# Patient Record
Sex: FEMALE | Race: BLACK OR AFRICAN AMERICAN | Employment: FULL TIME | URBAN - METROPOLITAN AREA
[De-identification: names, ages, dates, MRNs, and addresses within clinical notes are randomized per-mention and may not be internally consistent; named-entity substitution may affect disease eponyms.]

---

## 2021-04-19 ENCOUNTER — VIRTUAL VISIT (OUTPATIENT)
Dept: FAMILY MEDICINE CLINIC | Age: 26
End: 2021-04-19
Payer: COMMERCIAL

## 2021-04-19 DIAGNOSIS — R53.83 LOW ENERGY: ICD-10-CM

## 2021-04-19 DIAGNOSIS — F90.2 ADHD (ATTENTION DEFICIT HYPERACTIVITY DISORDER), COMBINED TYPE: Primary | ICD-10-CM

## 2021-04-19 DIAGNOSIS — Z71.89 ADVICE GIVEN ABOUT COVID-19 VIRUS INFECTION: ICD-10-CM

## 2021-04-19 DIAGNOSIS — D35.2 PITUITARY ADENOMA (HCC): ICD-10-CM

## 2021-04-19 PROCEDURE — 99203 OFFICE O/P NEW LOW 30 MIN: CPT | Performed by: FAMILY MEDICINE

## 2021-04-19 RX ORDER — ACETAMINOPHEN 500 MG
2000 TABLET ORAL DAILY
Qty: 30 CAP | Refills: 0 | Status: SHIPPED | OUTPATIENT
Start: 2021-04-19

## 2021-04-19 RX ORDER — MONTELUKAST SODIUM 10 MG/1
10 TABLET ORAL DAILY
COMMUNITY
End: 2021-07-26 | Stop reason: SDUPTHER

## 2021-04-19 RX ORDER — GALCANEZUMAB 120 MG/ML
1 INJECTION, SOLUTION SUBCUTANEOUS
COMMUNITY
End: 2021-04-19 | Stop reason: SDUPTHER

## 2021-04-19 RX ORDER — NORETHINDRONE ACETATE AND ETHINYL ESTRADIOL 1; 20 MG/1; UG/1
1 TABLET ORAL DAILY
Qty: 30 TAB | Refills: 11 | Status: SHIPPED | OUTPATIENT
Start: 2021-04-19 | End: 2021-06-22 | Stop reason: SDUPTHER

## 2021-04-19 RX ORDER — NORETHINDRONE ACETATE AND ETHINYL ESTRADIOL 1; .02 MG/1; MG/1
1 TABLET ORAL DAILY
COMMUNITY
End: 2021-04-19 | Stop reason: SDUPTHER

## 2021-04-19 RX ORDER — DEXTROAMPHETAMINE SACCHARATE, AMPHETAMINE ASPARTATE, DEXTROAMPHETAMINE SULFATE AND AMPHETAMINE SULFATE 2.5; 2.5; 2.5; 2.5 MG/1; MG/1; MG/1; MG/1
10 TABLET ORAL
COMMUNITY
End: 2021-06-22 | Stop reason: SDUPTHER

## 2021-04-19 RX ORDER — CETIRIZINE HYDROCHLORIDE 10 MG/1
CAPSULE, LIQUID FILLED ORAL DAILY
COMMUNITY

## 2021-04-19 RX ORDER — ACETAMINOPHEN 500 MG
2000 TABLET ORAL DAILY
COMMUNITY
End: 2021-04-19 | Stop reason: SDUPTHER

## 2021-04-19 RX ORDER — GALCANEZUMAB 120 MG/ML
1 INJECTION, SOLUTION SUBCUTANEOUS
Qty: 1 ADJUSTABLE DOSE PRE-FILLED PEN SYRINGE | Refills: 5 | Status: SHIPPED | OUTPATIENT
Start: 2021-04-19 | End: 2021-04-30 | Stop reason: SDUPTHER

## 2021-04-19 RX ORDER — BISMUTH SUBSALICYLATE 262 MG
1 TABLET,CHEWABLE ORAL DAILY
COMMUNITY

## 2021-04-19 RX ORDER — DEXTROAMPHETAMINE SACCHARATE, AMPHETAMINE ASPARTATE MONOHYDRATE, DEXTROAMPHETAMINE SULFATE AND AMPHETAMINE SULFATE 5; 5; 5; 5 MG/1; MG/1; MG/1; MG/1
20 CAPSULE, EXTENDED RELEASE ORAL
COMMUNITY
End: 2021-04-30 | Stop reason: SDUPTHER
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
## 2021-04-19 NOTE — PROGRESS NOTES
Chief Complaint   Patient presents with   [facility] Establish Care     1. Have you been to the ER, urgent care clinic since your last visit? Hospitalized since your last visit? No    2. Have you seen or consulted any other health care providers outside of the [address] [place] [address] since your last visit? Include any pap smears or colon screening. No    Call placed to pt. Verified patient with two type of identifiers. here to establish care. Relocated from [facility].

## 2021-04-23 ENCOUNTER — DOCUMENTATION ONLY (OUTPATIENT)
Dept: FAMILY MEDICINE CLINIC | Age: 26
End: 2021-04-23

## 2021-04-26 ENCOUNTER — CLINICAL SUPPORT (OUTPATIENT)
Dept: FAMILY MEDICINE CLINIC | Age: 26
End: 2021-04-26

## 2021-04-26 DIAGNOSIS — F90.2 ADHD (ATTENTION DEFICIT HYPERACTIVITY DISORDER), COMBINED TYPE: ICD-10-CM

## 2021-04-26 DIAGNOSIS — R53.83 LOW ENERGY: ICD-10-CM

## 2021-04-26 DIAGNOSIS — D35.2 PITUITARY ADENOMA (HCC): ICD-10-CM

## 2021-04-27 LAB
ALBUMIN SERPL-MCNC: 3.6 G/DL (ref 3.5–5)
ALBUMIN/GLOB SERPL: 0.9 {RATIO} (ref 1.1–2.2)
ALP SERPL-CCNC: 57 U/L (ref 45–117)
ALT SERPL-CCNC: 24 U/L (ref 12–78)
ANION GAP SERPL CALC-SCNC: 7 MMOL/L (ref 5–15)
AST SERPL-CCNC: 22 U/L (ref 15–37)
BASOPHILS # BLD: 0 K/UL (ref 0–0.1)
BASOPHILS NFR BLD: 1 % (ref 0–1)
BILIRUB SERPL-MCNC: 0.6 MG/DL (ref 0.2–1)
BUN SERPL-MCNC: 12 MG/DL (ref 6–20)
BUN/CREAT SERPL: 12 (ref 12–20)
CALCIUM SERPL-MCNC: 9.3 MG/DL (ref 8.5–10.1)
CHLORIDE SERPL-SCNC: 104 MMOL/L (ref 97–108)
CO2 SERPL-SCNC: 26 MMOL/L (ref 21–32)
CREAT SERPL-MCNC: 0.99 MG/DL (ref 0.55–1.02)
DIFFERENTIAL METHOD BLD: ABNORMAL
EOSINOPHIL # BLD: 0.2 K/UL (ref 0–0.4)
EOSINOPHIL NFR BLD: 5 % (ref 0–7)
ERYTHROCYTE [DISTWIDTH] IN BLOOD BY AUTOMATED COUNT: 13.2 % (ref 11.5–14.5)
EST. AVERAGE GLUCOSE BLD GHB EST-MCNC: 100 MG/DL
GLOBULIN SER CALC-MCNC: 4 G/DL (ref 2–4)
GLUCOSE SERPL-MCNC: 118 MG/DL (ref 65–100)
HBA1C MFR BLD: 5.1 % (ref 4–5.6)
HCT VFR BLD AUTO: 43.1 % (ref 35–47)
HGB BLD-MCNC: 13.6 G/DL (ref 11.5–16)
IMM GRANULOCYTES # BLD AUTO: 0 K/UL (ref 0–0.04)
IMM GRANULOCYTES NFR BLD AUTO: 0 % (ref 0–0.5)
LYMPHOCYTES # BLD: 2.4 K/UL (ref 0.8–3.5)
LYMPHOCYTES NFR BLD: 50 % (ref 12–49)
MCH RBC QN AUTO: 29.8 PG (ref 26–34)
MCHC RBC AUTO-ENTMCNC: 31.6 G/DL (ref 30–36.5)
MCV RBC AUTO: 94.3 FL (ref 80–99)
MONOCYTES # BLD: 0.3 K/UL (ref 0–1)
MONOCYTES NFR BLD: 7 % (ref 5–13)
NEUTS SEG # BLD: 1.8 K/UL (ref 1.8–8)
NEUTS SEG NFR BLD: 37 % (ref 32–75)
NRBC # BLD: 0 K/UL (ref 0–0.01)
NRBC BLD-RTO: 0 PER 100 WBC
PLATELET # BLD AUTO: 216 K/UL (ref 150–400)
PMV BLD AUTO: 9.9 FL (ref 8.9–12.9)
POTASSIUM SERPL-SCNC: 4 MMOL/L (ref 3.5–5.1)
PROLACTIN SERPL-MCNC: 24.6 NG/ML
PROT SERPL-MCNC: 7.6 G/DL (ref 6.4–8.2)
RBC # BLD AUTO: 4.57 M/UL (ref 3.8–5.2)
SODIUM SERPL-SCNC: 137 MMOL/L (ref 136–145)
TSH SERPL DL<=0.05 MIU/L-ACNC: 1.69 UIU/ML (ref 0.36–3.74)
WBC # BLD AUTO: 4.8 K/UL (ref 3.6–11)

## 2021-04-30 ENCOUNTER — VIRTUAL VISIT (OUTPATIENT)
Dept: FAMILY MEDICINE CLINIC | Age: 26
End: 2021-04-30
Payer: COMMERCIAL

## 2021-04-30 ENCOUNTER — DOCUMENTATION ONLY (OUTPATIENT)
Dept: FAMILY MEDICINE CLINIC | Age: 26
End: 2021-04-30

## 2021-04-30 DIAGNOSIS — F90.2 ADHD (ATTENTION DEFICIT HYPERACTIVITY DISORDER), COMBINED TYPE: ICD-10-CM

## 2021-04-30 DIAGNOSIS — G43.909 MIGRAINE SYNDROME: Primary | ICD-10-CM

## 2021-04-30 PROCEDURE — 99213 OFFICE O/P EST LOW 20 MIN: CPT | Performed by: FAMILY MEDICINE

## 2021-04-30 RX ORDER — GALCANEZUMAB 120 MG/ML
1 INJECTION, SOLUTION SUBCUTANEOUS
Qty: 1 ADJUSTABLE DOSE PRE-FILLED PEN SYRINGE | Refills: 5 | Status: SHIPPED | OUTPATIENT
Start: 2021-04-30 | End: 2021-07-26 | Stop reason: SDUPTHER

## 2021-04-30 RX ORDER — DEXTROAMPHETAMINE SACCHARATE, AMPHETAMINE ASPARTATE MONOHYDRATE, DEXTROAMPHETAMINE SULFATE AND AMPHETAMINE SULFATE 5; 5; 5; 5 MG/1; MG/1; MG/1; MG/1
20 CAPSULE, EXTENDED RELEASE ORAL
Qty: 30 CAP | Refills: 0 | Status: SHIPPED | OUTPATIENT
Start: 2021-04-30 | End: 2021-06-22 | Stop reason: SDUPTHER

## 2021-04-30 NOTE — PROGRESS NOTES
HISTORY OF PRESENT ILLNESS  Jan Finley is a 32 y.o. female, new patient second visit present on virtual visit, very upset stating that her migraine medication has not been approved secondary to medical doctor wrong documentations stating that she also did not get her Adderall patient was supposed to give urine drug analysis unfortunately was not done regardless today patient stating that she ran out of her Adderall and she requesting a refill, she is also upset that [facility] obtain laboratory testing from her without a consent!!!!!  Regarding record medical claim of pituitary adenoma thyroid disease and goiter on the last visit! Today she is upset that my PCP obtain some laboratory testing from her for her medical conditions she just requested to be given Adderall and her Xanax and a migraine injections prescriptions.      Patient with current diagnosis of migraine headache for which  patient states that she is having few migraines headache per week for the last 2 years and current medication has been able to control it very well, patient stating that the current migraine headache is one sided pulsating lasting few hrs, has tried otc not helping, pt states that the HA Worsens by lights and loud noises,     the pain is associated with feeling of  Nausea, and pt hadno voimiting the pain is 7/10 at this time,   if the HA occurs the pt is unable to do any work while having the HA,     ADHD, pt states that current med has been helping this serious medical condition,  Pt is able the perform and be functional at  School and at home,  Pt states that the INATTENTION, HYPERACTIVITY, IMPULSIVITY all are  getting better and are nice controlled better with the current meds,  was less distracted and forgetful , having a better concentration , a better time management and did not not Misplaces things ,   Socially, has had noTrouble with the Law, No hx of cardiac dz,no recent seizure, Not wanted to heart self or others    Current Outpatient Medications   Medication Sig Dispense Refill    amphetamine-dextroamphetamine XR (Adderall XR) 20 mg XR capsule Take 20 mg by mouth every morning.  dextroamphetamine-amphetamine (AdderalL) 10 mg tablet Take 10 mg by mouth daily as needed.  montelukast (Singulair) 10 mg tablet Take 10 mg by mouth daily.  Cetirizine (ZyrTEC) 10 mg cap Take  by mouth daily.  multivitamin (ONE A DAY) tablet Take 1 Tab by mouth daily.  cholecalciferol (VITAMIN D3) (2,000 UNITS /50 MCG) cap capsule Take 1 Cap by mouth daily. 30 Cap 0    Junel 1/20, 21, 1-20 mg-mcg tablet Take 1 Tab by mouth daily. Brand only pt with allergic rxn 30 Tab 11    galcanezumab-gnlm (Emgality Syringe) 120 mg/mL syrg 1 Syringe by SubCUTAneous route every month. 1 Adjustable Dose Pre-filled Pen Syringe 5     Allergies   Allergen Reactions    Azithromycin Hives    Pcn [Penicillins] Swelling     Past Medical History:   Diagnosis Date    ADHD     GERD (gastroesophageal reflux disease)     Headache      No past surgical history on file. Family History   Problem Relation Age of Onset    Attention Deficit Hyperactivity Disorder Mother     No Known Problems Father      Social History     Tobacco Use    Smoking status: Never Smoker    Smokeless tobacco: Never Used   Substance Use Topics    Alcohol use: Yes     Comment: social      Lab Results   Component Value Date/Time    Hemoglobin A1c 5.1 04/26/2021 01:06 PM    Glucose 118 (H) 04/26/2021 01:06 PM    Creatinine 0.99 04/26/2021 01:06 PM      No results found for: CHOL, CHOLPOCT, HDL, LDL, LDLC, LDLCPOC, LDLCEXT, TRIGL, TGLPOCT, CHHD, CHHDX     Review of Systems   Constitutional: Negative for chills and fever. HENT: Negative for nosebleeds. Eyes: Negative for pain. Respiratory: Negative for cough and wheezing. Cardiovascular: Negative for chest pain and leg swelling. Gastrointestinal: Negative for constipation, diarrhea and nausea. Genitourinary: Negative for frequency. Musculoskeletal: Negative for joint pain and myalgias. Skin: Negative for rash. Neurological: Positive for headaches. Negative for loss of consciousness. Endo/Heme/Allergies: Does not bruise/bleed easily. Psychiatric/Behavioral: Negative for depression. The patient is not nervous/anxious and does not have insomnia. All other systems reviewed and are negative. Physical Exam  Constitutional:       Appearance: She is not ill-appearing or toxic-appearing. HENT:      Head: Normocephalic and atraumatic. Mouth/Throat:      Mouth: Mucous membranes are moist.   Neurological:      Mental Status: She is alert and oriented to person, place, and time. Psychiatric:         Mood and Affect: Mood normal.         Behavior: Behavior normal.         Thought Content: Thought content normal.         Judgment: Judgment normal.         ASSESSMENT and PLAN  Diagnoses and all orders for this visit:    1. Migraine syndrome  -     amphetamine-dextroamphetamine XR (Adderall XR) 20 mg XR capsule; Take 1 Cap by mouth every morning. Max Daily Amount: 20 mg.  -     REFERRAL TO PSYCHIATRY    2. ADHD (attention deficit hyperactivity disorder), combined type  -     amphetamine-dextroamphetamine XR (Adderall XR) 20 mg XR capsule; Take 1 Cap by mouth every morning. Max Daily Amount: 20 mg.  -     REFERRAL TO PSYCHIATRY    Other orders  -     galcanezumab-gnlm (Emgality Syringe) 120 mg/mL syrg; 1 Syringe by SubCUTAneous route every month.           Unfortunately no urine analysis was missed for some unknown reason might have been can salt so there is no drug urine screen patient was given for [facility] refill regardless was told to return to clinic for urine analysis drug screen patient acknowledged and agreed with today's recommendation,   Patient advised to have the mask on most of the time, social distance and handwashing avoid crowded area pursuant to the emergency declaration under the [facility] Act and [facility], 13 waiver authority and the [employer] Act, this Virtual Visit was conducted, with patient's consent, to reduce the patient's risk of exposure to COVID-19 and provide continuity of care for an established patient  Services were provided through a Video synchronous discussion virtually to substitute for in-person appointment.

## 2021-04-30 NOTE — PROGRESS NOTES
Call placed to pt,  Informed per lab that her urine drug screen was lost , need to resubmit sample. Pt stated understanding.   Lab only appt scheduled for [email or web address]

## 2021-04-30 NOTE — PROGRESS NOTES
Chief Complaint   Patient presents with    Headache     1. Have you been to the ER, urgent care clinic since your last visit? Hospitalized since your last visit? No    2. Have you seen or consulted any other health care providers outside of the [address] [place] [address] since your last visit? Include any pap smears or colon screening. No      Call placed to pt. Verified patient with two type of identifiers.   requesting to update chart on migraine frequency and need for emgality

## 2021-05-03 ENCOUNTER — CLINICAL SUPPORT (OUTPATIENT)
Dept: FAMILY MEDICINE CLINIC | Age: 26
End: 2021-05-03

## 2021-05-03 DIAGNOSIS — Z02.89 MEDICATION MANAGEMENT CONTRACT AGREEMENT: Primary | ICD-10-CM

## 2021-05-03 NOTE — PROGRESS NOTES
Order placed for  Urine drug screen , per Verbal Order from Dr. Yuko Adames on 5/3/2021 due to lab needs new sample

## 2021-05-05 LAB — DRUGS UR: NORMAL

## 2021-05-07 ENCOUNTER — DOCUMENTATION ONLY (OUTPATIENT)
Dept: FAMILY MEDICINE CLINIC | Age: 26
End: 2021-05-07

## 2021-06-22 ENCOUNTER — VIRTUAL VISIT (OUTPATIENT)
Dept: FAMILY MEDICINE CLINIC | Age: 26
End: 2021-06-22
Payer: COMMERCIAL

## 2021-06-22 DIAGNOSIS — F90.2 ADHD (ATTENTION DEFICIT HYPERACTIVITY DISORDER), COMBINED TYPE: ICD-10-CM

## 2021-06-22 DIAGNOSIS — Z02.89 MEDICATION MANAGEMENT CONTRACT AGREEMENT: Primary | ICD-10-CM

## 2021-06-22 DIAGNOSIS — G43.909 MIGRAINE SYNDROME: ICD-10-CM

## 2021-06-22 PROCEDURE — 99213 OFFICE O/P EST LOW 20 MIN: CPT | Performed by: FAMILY MEDICINE

## 2021-06-22 RX ORDER — DEXTROAMPHETAMINE SACCHARATE, AMPHETAMINE ASPARTATE MONOHYDRATE, DEXTROAMPHETAMINE SULFATE AND AMPHETAMINE SULFATE 5; 5; 5; 5 MG/1; MG/1; MG/1; MG/1
20 CAPSULE, EXTENDED RELEASE ORAL
Qty: 30 CAPSULE | Refills: 0 | Status: SHIPPED | OUTPATIENT
Start: 2021-06-22 | End: 2021-07-26 | Stop reason: SDUPTHER

## 2021-06-22 RX ORDER — DEXTROAMPHETAMINE SACCHARATE, AMPHETAMINE ASPARTATE, DEXTROAMPHETAMINE SULFATE AND AMPHETAMINE SULFATE 2.5; 2.5; 2.5; 2.5 MG/1; MG/1; MG/1; MG/1
10 TABLET ORAL
Qty: 30 TABLET | Refills: 0 | Status: SHIPPED | OUTPATIENT
Start: 2021-06-22

## 2021-06-22 RX ORDER — NORETHINDRONE ACETATE AND ETHINYL ESTRADIOL 1; 20 MG/1; UG/1
1 TABLET ORAL DAILY
Qty: 30 TABLET | Refills: 11 | Status: SHIPPED | OUTPATIENT
Start: 2021-06-22 | End: 2021-07-26 | Stop reason: ALTCHOICE

## 2021-06-22 NOTE — PROGRESS NOTES
HISTORY OF PRESENT ILLNESS  Booker Calhoun is a 32 y.o. female. Pt's main concerns were provided on virtual visit, a telemed format,  pt is w/out  comorbid medical history and unaware of been exposed to covid-19 individual, Pt Have been trying to stay safe  ,   ADHD, pt states that current med has been helping this serious medical condition, having one kid, able the pt to do perform at the  job, and be functional at work and at home, and enabling the pt of providing care for the family,   Pt states that the INATTENTION, HYPERACTIVITY, IMPULSIVITY all are not getting better and are not better with the current meds, patient currently requesting to be on stimulating despite their side effects, patient states that when the was put on such medication, Pt reported of less Difficulty and was able to sustain attention to reading or paperwork, was less distracted and forgetful , having a better concentration , a better time management and did not not Misplaces things ,   Socially, has had noTrouble with the Law, No hx of cardiac dz,no recent seizure, Not wanted to heart self or others        Current Outpatient Medications   Medication Sig Dispense Refill    galcanezumab-gnlm (Emgality Syringe) 120 mg/mL syrg 1 Syringe by SubCUTAneous route every month. 1 Adjustable Dose Pre-filled Pen Syringe 5    amphetamine-dextroamphetamine XR (Adderall XR) 20 mg XR capsule Take 1 Cap by mouth every morning. Max Daily Amount: 20 mg. 30 Cap 0    dextroamphetamine-amphetamine (AdderalL) 10 mg tablet Take 10 mg by mouth daily as needed.  montelukast (Singulair) 10 mg tablet Take 10 mg by mouth daily.  Cetirizine (ZyrTEC) 10 mg cap Take  by mouth daily.  multivitamin (ONE A DAY) tablet Take 1 Tab by mouth daily.  cholecalciferol (VITAMIN D3) (2,000 UNITS /50 MCG) cap capsule Take 1 Cap by mouth daily. 30 Cap 0    Junel 1/20, 21, 1-20 mg-mcg tablet Take 1 Tab by mouth daily.  Brand only pt with allergic rxn 30 Tab 11     Allergies   Allergen Reactions    Azithromycin Hives    Pcn [Penicillins] Swelling     Past Medical History:   Diagnosis Date    ADHD     GERD (gastroesophageal reflux disease)     Headache      History reviewed. No pertinent surgical history. Family History   Problem Relation Age of Onset    Attention Deficit Hyperactivity Disorder Mother     No Known Problems Father      Social History     Tobacco Use    Smoking status: Never Smoker    Smokeless tobacco: Never Used   Substance Use Topics    Alcohol use: Yes     Comment: social      Lab Results   Component Value Date/Time    Hemoglobin A1c 5.1 04/26/2021 01:06 PM    Glucose 118 (H) 04/26/2021 01:06 PM    Creatinine 0.99 04/26/2021 01:06 PM      Lab Results   Component Value Date/Time    GFR est non-AA >60 04/26/2021 01:06 PM    GFR est AA >60 04/26/2021 01:06 PM    Creatinine 0.99 04/26/2021 01:06 PM    BUN 12 04/26/2021 01:06 PM    Sodium 137 04/26/2021 01:06 PM    Potassium 4.0 04/26/2021 01:06 PM    Chloride 104 04/26/2021 01:06 PM    CO2 26 04/26/2021 01:06 PM        Review of Systems   Constitutional: Negative for chills, fever and malaise/fatigue. HENT: Negative for nosebleeds. Eyes: Negative for pain. Respiratory: Negative for cough and wheezing. Cardiovascular: Negative for chest pain and leg swelling. Gastrointestinal: Negative for constipation, diarrhea and nausea. Genitourinary: Negative for frequency. Musculoskeletal: Negative for joint pain and myalgias. Skin: Negative for rash. Neurological: Negative for loss of consciousness. Endo/Heme/Allergies: Does not bruise/bleed easily. Psychiatric/Behavioral: Negative for depression. The patient is not nervous/anxious and does not have insomnia. All other systems reviewed and are negative. Physical Exam  Constitutional:       Appearance: She is obese. She is not ill-appearing or toxic-appearing. HENT:      Head: Normocephalic and atraumatic. Mouth/Throat:      Mouth: Mucous membranes are moist.   Neurological:      Mental Status: She is alert and oriented to person, place, and time. Psychiatric:         Mood and Affect: Mood normal.         Behavior: Behavior normal.         Thought Content: Thought content normal.         Judgment: Judgment normal.         ASSESSMENT and PLAN  Diagnoses and all orders for this visit:    1. Medication management contract agreement    2. Migraine syndrome    3. ADHD (attention deficit hyperactivity disorder), combined type  -     amphetamine-dextroamphetamine XR (Adderall XR) 20 mg XR capsule; Take 1 Capsule by mouth every morning. Max Daily Amount: 20 mg.  -     dextroamphetamine-amphetamine (AdderalL) 10 mg tablet; Take 1 Tablet by mouth daily as needed for Other (adhd). -     Junel 1/20, 21, 1-20 mg-mcg tablet; Take 1 Tablet by mouth daily.  Brand only pt with allergic rxn  -     REFERRAL TO PSYCHIATRY          Presentation currently indicating INATTENTION, indicating HYPERACTIVITY, MPULSIVITY, The behaviors will have significant impairment in functioning in either social, academic, or occupational areas,  ADHD is in a stable condition, not suicidal,refill the medications for now will reevaluate by psychiatrist for progression or the side effects of the medication,   and in addition of the Counseling , social support, the spiritual belonging, inc physical activity, all offered,  compliance with meds advised, was told to call for any concern, the contract reviewed and will be updated urine drug screen is also be obtained, pt agreed with todays recommendations

## 2021-06-22 NOTE — LETTER
AGREEMENT for controlled medication treatment Radha Dubois, have agreed to a course of treatment that includes taking controlled medication. For this treatment I designate _____________________________________ as the [facility] Provider.  The purpose of this agreement is to prevent misunderstandings about controlled medications I may be taking for treatment, and to comply with applicable laws. I understand this agreement is essential to the trust and confidence necessary in a provider-patient relationship and that the designated provider will treat me in accordance with the statements below. As part of my treatment I agree to the followin.  USE 
a. I will take the controlled medication as instructed by the designated provider and avoid improper use of controlled medications. b.   I will not share, sell or trade controlled medication with anyone as this is a criminal offense.  
c.   I will not use any illegal controlled substance, including marijuana, cocaine, etc. as this is a criminal offense. 2.  PROVIDERS 
a. I will only obtain controlled medication from the designated provider. b.   I will not attempt to obtain the same or similar controlled medications, such as opioid pain medication, stimulants, anti-anxiety or hypnotics from any other provider as this is a criminal offense. 
c.   I will inform the designated provider about all other licensed professionals providing medical care to me and authorize communication between all providers to coordinate care, particularly prescribing or dispensing of controlled medications. 3.  PHARMACY 
a.   I will only fill controlled medication prescriptions at the approved pharmacy as listed below. 4.  OFFICE VISITS 
a. I agree to attend scheduled office visit appointments. b.   I am aware my office visits may be monthly or as determined necessary by the designated provider.  
c.   I will communicate fully with the designated provider about the character and intensity of my symptoms, the effect of the symptoms on my daily life, and how well the controlled medication is helping to relieve the cause of my symptoms. 5.  REFILLS OR CALL-IN PRESCRIPTIONS OF CONTROLLED MEDICATIONS 
a. I agree that refills of my prescriptions for controlled medications will be made at the time of an office visit during regular office hours. No refills will be available during evenings or on weekends. Abusive or inappropriate behavior related to medication refills will not be tolerated. b.   I will not call the office repeatedly to inquire about my controlled medication. I understand that medications will be written on the due date and not before. Calling the office repeatedly will be considered harassment, and I may be discharged from the practice. c.   Controlled medications may not be called in for refill, but doing so is at the discretion of the designated provider. d.   I am aware that only I must  prescriptions for controlled medications at the office but the designated provider may allow a designee to  the prescription from the office under very specific circumstance that may develop. 6.  TOXICOLOGY SCREENING 
a. I agree to random urine toxicology screenings in order to comply with government and [address] [place] [address] regulations. I understand that I will be financially responsible for any charges incurred for the urine toxicology screening, which may not be covered by my insurance. Failure to do so will be considered non-compliance and I may be discharged. b. In some cases an oral swab or hair sample may be substituted for a urine screen. This is at the discretion of the designated provider.   I understand that I will be financially responsible for any charges incurred as well. 
c.   I understand that if the urine toxicology does not show my medications prescribed to me by the designated provider, or it shows any illegal substance or any other medications NOT prescribed by the designated providers, I may be discharged from this practice at the discretion of the designated provider and no further controlled medications will be prescribed or follow-up appointments scheduled. Also, if any illegal substances are detected on the urine toxicology, this information may be provided to local law enforcement. 7. PILL COUNTS 
a. I am aware I may be called at any time to come into the office for a count of all my remaining controlled medications in order to help the designated provider understand the rate at which I use my controlled medications and to more effectively adjust dosage. b. I agree that I will use my medication at a rate NO greater than the prescribed rate and that use of my medication at a greater rate will result in my being without medication for the period of time until next expected due date. c. I will bring in the containers with the medication prescribed by all providers, including the designated provider, to each office visit even if there is no medication remaining. All controlled medication will be in the original containers from the pharmacy for each medication. Failure to do so will be considered non-compliance and I may be discharged from the practice. 8.  LOSS OR THEFT OF MEDICATION 
a. I will safeguard my controlled medication from loss or theft. b.   Lost or stolen controlled medication may not be replaced. This includes a prescription that has not yet been filled at the pharmacy. c.   In the event my controlled medications are stolen or lost, I will notify the designated providers office immediately. If such event occurs during the night, weekend or holiday, I will leave a detailed message on the answering machine or answering service at the number listed above. 
d.   I will file and produce an official police report for any effort to replace controlled medications prescribed. 9.  AGENCY COLLABORATION I authorize the designated provider and the authorized pharmacy/pharmacist to cooperate fully with any city, state, or federal law enforcement agency in the investigation of any possible misuse, sale or other diversion of my controlled medication. 10.  TREATMENT I understand that if I violate any of the conditions, my controlled medication and/or treatment will be terminated. If the violation involves obtaining controlled substances and/or dangerous drugs from another source, the incident may be reported to other medical facilities and authorities, including law enforcement. In this case, the designated provider may taper off the medication over a period of several days, as necessary, to avoid withdrawal symptoms or will suggest alternate treatment facilities. Also, a drug dependence treatment program may be recommended. 11.  AGREEMENT I agree to follow these guidelines that have been fully explained to me. All of my questions and concerns regarding treatment have been adequately answered. A copy of this document has been given to me. I agree to use ______________________________ Authorized Pharmacy located at _________________________________________________________________ Telephone ________________________________for filling ALL controlled medication prescriptions. This agreement is entered into on 6/22/2021 Patient signature__________________________________________________________ Legal Guardian signature___________________________________________________ Provider signature_________________________________________________________ Witness signature_________________________________________________________

## 2021-06-22 NOTE — PROGRESS NOTES
1. Have you been to the ER, urgent care clinic since your last visit? Hospitalized since your last visit? No    2. Have you seen or consulted any other health care providers outside of the [address] [place] [address] since your last visit? Include any pap smears or colon screening. No     Chief Complaint   Patient presents with    Medication Refill     Patient requesting refills on adderall.

## 2021-07-26 ENCOUNTER — VIRTUAL VISIT (OUTPATIENT)
Dept: FAMILY MEDICINE CLINIC | Age: 26
End: 2021-07-26
Payer: COMMERCIAL

## 2021-07-26 DIAGNOSIS — Z30.41 ENCOUNTER FOR SURVEILLANCE OF CONTRACEPTIVE PILLS: ICD-10-CM

## 2021-07-26 DIAGNOSIS — G43.909 MIGRAINE SYNDROME: Primary | ICD-10-CM

## 2021-07-26 DIAGNOSIS — D35.2 PITUITARY ADENOMA (HCC): ICD-10-CM

## 2021-07-26 DIAGNOSIS — F90.2 ADHD (ATTENTION DEFICIT HYPERACTIVITY DISORDER), COMBINED TYPE: ICD-10-CM

## 2021-07-26 PROCEDURE — 99213 OFFICE O/P EST LOW 20 MIN: CPT | Performed by: FAMILY MEDICINE

## 2021-07-26 RX ORDER — MONTELUKAST SODIUM 10 MG/1
10 TABLET ORAL DAILY
Qty: 30 TABLET | Refills: 5 | Status: SHIPPED | OUTPATIENT
Start: 2021-07-26

## 2021-07-26 RX ORDER — NORETHINDRONE ACETATE AND ETHINYL ESTRADIOL 1MG-20(21)
1 KIT ORAL DAILY
Qty: 30 TABLET | Refills: 3 | Status: SHIPPED | OUTPATIENT
Start: 2021-07-26

## 2021-07-26 RX ORDER — NORETHINDRONE ACETATE AND ETHINYL ESTRADIOL 1; .02 MG/1; MG/1
1 TABLET ORAL DAILY
Qty: 90 TABLET | Refills: 3 | Status: CANCELLED | OUTPATIENT
Start: 2021-07-26

## 2021-07-26 RX ORDER — DEXTROAMPHETAMINE SACCHARATE, AMPHETAMINE ASPARTATE MONOHYDRATE, DEXTROAMPHETAMINE SULFATE AND AMPHETAMINE SULFATE 5; 5; 5; 5 MG/1; MG/1; MG/1; MG/1
20 CAPSULE, EXTENDED RELEASE ORAL
Qty: 30 CAPSULE | Refills: 0 | Status: SHIPPED | OUTPATIENT
Start: 2021-07-26

## 2021-07-26 RX ORDER — GALCANEZUMAB 120 MG/ML
1 INJECTION, SOLUTION SUBCUTANEOUS
Qty: 1 ADJUSTABLE DOSE PRE-FILLED PEN SYRINGE | Refills: 5 | Status: SHIPPED | OUTPATIENT
Start: 2021-07-26

## 2021-07-26 NOTE — PROGRESS NOTES
Chief Complaint   Patient presents with    Behavioral Problem     adhd     1. Have you been to the ER, urgent care clinic since your last visit? Hospitalized since your last visit? No    2. Have you seen or consulted any other health care providers outside of the [address] [place] [address] since your last visit? Include any pap smears or colon screening. No     Verified patient with two type of identifiers. Requesting endo referral for pituitary or thyroid problems.

## 2021-07-26 NOTE — PROGRESS NOTES
HISTORY OF PRESENT ILLNESS  Zofia Iverson is a 32 y.o. female,  Present with ADHD, pt states that current med has been helping this serious medical condition,  able the pt to be more functional,   Pt states that the INATTENTION, HYPERACTIVITY, IMPULSIVITY all are better with the current meds, patient currently requesting to be cont on with the current meds despite its stimulating  effects, Pt reported of less Difficulty and was able to sustain attention to reading or paperwork, was less distracted and forgetful , having a better concentration , a better time management and did not not Misplaces things ,   Socially, has had noTrouble with the Law, No hx of cardiac dz,no recent seizure, Not wanted to heart self or others    Also with the c/o stable HA with the current meds, her HA Started few yrs Ago, w/out meds the HA is one sided pulsating lasting few hrs, and her HA Worsens by lights and loud noises, is associated with the  feeling of Nausea, and the pain is 7/10  W/out meds,  otherwise  it occures 2-4 times per wk,    if the HA occurs the pt is unable to do any work while having the HA,   lmp 3 wks ago and states that she is not pregantn      Current Outpatient Medications   Medication Sig Dispense Refill    amphetamine-dextroamphetamine XR (Adderall XR) 20 mg XR capsule Take 1 Capsule by mouth every morning. Max Daily Amount: 20 mg. 30 Capsule 0    Junel 1/20, 21, 1-20 mg-mcg tablet Take 1 Tablet by mouth daily. Brand only pt with allergic rxn 30 Tablet 11    galcanezumab-gnlm (Emgality Syringe) 120 mg/mL syrg 1 Syringe by SubCUTAneous route every month. 1 Adjustable Dose Pre-filled Pen Syringe 5    montelukast (Singulair) 10 mg tablet Take 10 mg by mouth daily.  Cetirizine (ZyrTEC) 10 mg cap Take  by mouth daily.  multivitamin (ONE A DAY) tablet Take 1 Tab by mouth daily.  cholecalciferol (VITAMIN D3) (2,000 UNITS /50 MCG) cap capsule Take 1 Cap by mouth daily.  30 Cap 0    dextroamphetamine-amphetamine (AdderalL) 10 mg tablet Take 1 Tablet by mouth daily as needed for Other (adhd). 30 Tablet 0     Allergies   Allergen Reactions    Azithromycin Hives    Pcn [Penicillins] Swelling     Past Medical History:   Diagnosis Date    ADHD     GERD (gastroesophageal reflux disease)     Headache      No past surgical history on file. Family History   Problem Relation Age of Onset    Attention Deficit Hyperactivity Disorder Mother     No Known Problems Father      Social History     Tobacco Use    Smoking status: Never Smoker    Smokeless tobacco: Never Used   Substance Use Topics    Alcohol use: Yes     Comment: social      Lab Results   Component Value Date/Time    WBC 4.8 04/26/2021 01:06 PM    HGB 13.6 04/26/2021 01:06 PM    HCT 43.1 04/26/2021 01:06 PM    PLATELET 601 15/97/0940 01:06 PM    MCV 94.3 04/26/2021 01:06 PM     Lab Results   Component Value Date/Time    GFR est non-AA >60 04/26/2021 01:06 PM    GFR est AA >60 04/26/2021 01:06 PM    Creatinine 0.99 04/26/2021 01:06 PM    BUN 12 04/26/2021 01:06 PM    Sodium 137 04/26/2021 01:06 PM    Potassium 4.0 04/26/2021 01:06 PM    Chloride 104 04/26/2021 01:06 PM    CO2 26 04/26/2021 01:06 PM        Review of Systems   Constitutional: Negative for chills and fever. HENT: Negative for congestion and nosebleeds. Eyes: Negative for blurred vision and pain. Respiratory: Negative for cough, shortness of breath and wheezing. Cardiovascular: Negative for chest pain and leg swelling. Gastrointestinal: Negative for constipation, diarrhea, nausea and vomiting. Genitourinary: Negative for dysuria and frequency. Musculoskeletal: Negative for joint pain and myalgias. Skin: Negative for itching and rash. Neurological: Negative for dizziness, loss of consciousness and headaches. Psychiatric/Behavioral: Negative for depression. The patient is not nervous/anxious and does not have insomnia.         Physical Exam  Constitutional:       Appearance: She is not ill-appearing or toxic-appearing. HENT:      Head: Normocephalic and atraumatic. Mouth/Throat:      Mouth: Mucous membranes are moist.   Neurological:      Mental Status: She is alert and oriented to person, place, and time. Psychiatric:         Mood and Affect: Mood normal.         Behavior: Behavior normal.         Thought Content: Thought content normal.         Judgment: Judgment normal.         ASSESSMENT and PLAN  Diagnoses and all orders for this visit:    1. Migraine syndrome  -     galcanezumab-gnlm (Emgality Syringe) 120 mg/mL syrg; 1 Syringe by SubCUTAneous route every month. -     montelukast (Singulair) 10 mg tablet; Take 1 Tablet by mouth daily. 2. ADHD (attention deficit hyperactivity disorder), combined type  -     amphetamine-dextroamphetamine XR (Adderall XR) 20 mg XR capsule; Take 1 Capsule by mouth every morning. Max Daily Amount: 20 mg.  -     REFERRAL TO ENDOCRINOLOGY  -     galcanezumab-gnlm (Emgality Syringe) 120 mg/mL syrg; 1 Syringe by SubCUTAneous route every month. 3. Pituitary adenoma ([facility] [address]  -     REFERRAL TO ENDOCRINOLOGY    4. Encounter for surveillance of contraceptive pills  -     norethindrone-ethinyl estradiol (JUNEL FE 1/20) 1 mg-20 mcg (21)/75 mg (7) tab; Take 1 Tablet by mouth daily. Patient advised to have the mask on most of the time, social distance and handwashing avoid crowded area pursuant to the emergency declaration under the [address] and [place] [address] [facility] authority and the [employer] Act, this Virtual Visit was conducted, with patient's consent, to reduce the patient's risk of exposure to COVID-19 and provide continuity of care for an established patient  Services were provided through a Video synchronous discussion virtually to substitute for in-person appointment.